# Patient Record
Sex: FEMALE | Race: WHITE
[De-identification: names, ages, dates, MRNs, and addresses within clinical notes are randomized per-mention and may not be internally consistent; named-entity substitution may affect disease eponyms.]

---

## 2017-03-03 ENCOUNTER — HOSPITAL ENCOUNTER (EMERGENCY)
Dept: HOSPITAL 10 - FTE | Age: 42
Discharge: HOME | End: 2017-03-03
Payer: COMMERCIAL

## 2017-03-03 VITALS
WEIGHT: 155.43 LBS | HEIGHT: 61 IN | BODY MASS INDEX: 29.34 KG/M2 | WEIGHT: 155.43 LBS | BODY MASS INDEX: 29.34 KG/M2 | HEIGHT: 61 IN

## 2017-03-03 VITALS — RESPIRATION RATE: 16 BRPM
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
## 2017-03-03 DIAGNOSIS — S00.03XA: Primary | ICD-10-CM

## 2017-03-03 PROCEDURE — 70486 CT MAXILLOFACIAL W/O DYE: CPT

## 2017-03-03 PROCEDURE — 70450 CT HEAD/BRAIN W/O DYE: CPT

## 2017-03-03 NOTE — RADRPT
PROCEDURE:   CT Brain without contrast. 

 

CLINICAL INDICATION:    Status post fell yesterday.  Headache. 

 

TECHNIQUE:   A CT of the brain was performed on a GE Light[employer]Heal[employer] 64-slice CT scanner utilizing axial 
imaging from the skull base through the vertex without IV contrast.  Multiplanar reformatted images 
were made.  Images were reviewed on a PACS workstation.  The CTDIvol is 38.2 mGy and the DLP is 554.
95 mGycm.  

One or the following dose reduction techniques were used:

-Automated exposure control.

-Adjustment of the mA and/or KV according to patient's size.

-Use of iterative reconstruction technique

 

 

COMPARISON:   None 

 

FINDINGS:

There is no intracranial hemorrhage, mass effect, or midline shift.  No extra-axial fluid collection
 is seen. The ventricles and sulci are normal in size and configuration. The density of the brain is
 normal, and the gray white matter differentiation appears well-preserved.  There is left frontal so
ft tissue swelling.  The visualized paranasal sinuses and osseous structures are grossly unremarkabl
e. 

 

IMPRESSION:

 

1.  No evidence of acute intracranial pathology.

 

2.  The brain is normal in appearance. 

 

3.  Left frontal soft tissue swelling.

 

 

RPTAT: AACC

_____________________________________________ 

Physician Leslie           Date    Time 

Electronically viewed and signed by Physician Leslie on 03/03/2017 13:05 

 

D:  03/03/2017 13:05  T:  03/03/2017 13:05

DIXIE/

## 2017-03-03 NOTE — RADRPT
PROCEDURE:   CT Maxillofacial without Contrast

 

CLINICAL INDICATION:  Headache, status post fall

 

TECHNIQUE:   Transaxial images were obtained through the maxillofacial region on a multi-slice scann
er without the intravenous contrast administration. Sagittal and coronal re-formations were subseque
ntly reconstructed.

 

One or more of the following dose reduction techniques were used:

- Automated exposure control.

- Adjustment of the mA and/or kV according to patient size.

- Use of iterative reconstruction technique.

 

Radiation dose: CTDIvol = 38.20 mGy; DLP = 554.95 mGy-cm.

 

COMPARISON:   No prior studies are available for comparison. 

 

FINDINGS:

 

Osseous structures: Appear intact with no fracture or destructive process evident.

 

Paranasal sinuses: Mucoperiosteal thickening is seen within several right posterior ethmoid air cell
s.  The remaining paranasal sinuses are well-aerated.

 

Mastoid air cells: Appear well pneumatized.

 

Temporomandibular joints: Appear unremarkable.

 

Orbits: The ocular globes, optic nerves, and intraorbital contents appear unremarkable.

 

Soft tissues: There is a subtle left frontal scalp hematoma.

 

IMPRESSION: 

1.  No fracture is identified.

2.  Mild inflammatory change involving posterior right ethmoid air cells.

3.  Subtle left frontal scalp hematoma.

_____________________________________________ 

Physician Cristian           Date    Time 

Electronically viewed and signed by Physician Cristian on 03/03/2017 13:12 

 

D:  03/03/2017 13:12  T:  03/03/2017 13:12

/

## 2017-03-03 NOTE — ERD
ER Documentation


Chief Complaint


Date/Time


DATE: 3/3/17 


TIME: 13:15


Chief Complaint


HEADACHE & HEAD PRESSURE S/P GROUND LEVEL MECHANICAL FALL





HPI


Patient is a 41-year-old female who presents to the emergency department with a 

headache and increased "head pressure" status post ground-level fall yesterday.

  Patient states that she was in the supermarket parking lot when she tripped 

on the concrete and fell.  Patient states she tried to catch her fall on her 

hands however her head took the brunt of the impact.   Patient denies any 

dizziness or lightheadedness prior to ground-level fall.  She stated that she 

remembers tripping and recalled the full event.  Patient reports an abrasion 

over her nasal bridge.  Patient also states that she had large scalp hematoma 

to the left frontal/temporal region.  Patient reports icing affected area.  

Patient states she currently has mild headache primarily in the frontal region.

  She describes her pain to be a 5 out of 10.  Patient states that the pain is 

constant.  Patient denies any blurry vision.  Patient admits to some light 

sensitivity.  Patient denies any nausea, vomiting, confusion, excessive 

sleepiness or loss of consciousness.  Patient states she did take 2 Advil last 

night for her headache.  Patient denies any chest pain, shortness of breath, 

abdominal pain, upper or lower extremity pain.  Patient is able to ambulate 

with any difficulty. LMP= 1 week ago.





ROS


All systems reviewed and are negative except as per history of present illness.





Allergies


Allergies:  


Coded Allergies:  


     No Known Allergy (Unverified , 3/3/17)





PMhx/Soc


Medical and Surgical Hx:  pt denies Medical Hx, pt denies Surgical Hx


Hx Alcohol Use:  Yes (occasional)


Hx Substance Use:  No


Hx Tobacco Use:  No


Smoking Status:  Never smoker





Physical Exam


Vitals





Vital Signs








  Date Time  Temp Pulse Resp B/P Pulse Ox O2 Delivery O2 Flow Rate FiO2


 


3/3/17 13:38   16   Room Air  


 


3/3/17 10:55 98.4 81 20 131/74 98   








Physical Exam


GENERAL: Well-developed, well-nourished female. Appears in no acute distress.  

Speaking in full sentences


HEAD: Normocephalic, atraumatic. +Scalp hematoma of left frontal head with 

superficial abrasions. Affected area in tender to palpation. No bilateral 

mastoid process tenderness or ecchymosis.


EYE: Pupils equal, round, and reactive to light. EOMs intact. No conjunctival 

erythema. No eye discharge.  No periorbital ecchymosis bilaterally


ENT: External ear without any masses or tenderness. Auditory canals clear 

bilaterally.  No hematotympanum bilaterally. TM visualized bilaterally, non-

erythematous, non-bulging. Nasal mucosa pink with no discharge. No septal 

hematoma. Oropharynx is pink without any tonsillar erythema or exudates. No 

uvula deviation. No kissing tonsils. 


NECK: Supple. No meningismus. Normal ROM of the neck.  No cervical midline 

spine tenderness.


LUNG: Clear to auscultation bilaterally. No rhonchi, wheezing, rales or coarse 

breath sounds. 


HEART: Regular rate and rhythm. No murmurs, rubs or gallops


ABDOMEN: Soft, nontender, and nondistended. Positive bowel sounds in all four 

quadrants. No rebound tenderness, no guarding. (-) McBurney's point tenderness.

  No CVA tenderness.


BACK: No midline tenderness. 


EXTREMITIES: Equal pulses bilaterally. No peripheral clubbing, cyanosis or 

edema. No unilateral leg swelling.  


NEUROLOGIC: Alert and oriented x3, cooperative. Mood and affect appropriate to 

situation. Cranial nerves II through XII are grossly intact. Normal speech. 

Motor exam: 5/5 strength in upper and lower extremities. Sensory exam: 

Sensation intact to light touch on all four extremities. Cerebellar function 

exam: No dysmetria on finger-to-nose. Steady gait. No pronator drift. 


SKIN: Normal color. Warm and dry. No rashes or lesions.


MSK: Normal active range of motion of bilateral upper extremities.  Normal 

range active of motion of bilateral lower extremities.





Procedures/MDM


ED COURSE:


The patient was stable throughout ED course. I kept the patient and/or family 

informed of laboratory and diagnostic imaging results throughout the ED course.

  








DIAGNOSTIC IMAGING:


Read by radiologist.








 DIAGNOSTIC IMAGING REPORT





 Patient: DION LOCKE   : 1975   Age: 41  Sex: F                 

       


 MR #:    [id number]   [facility] #:   [id number]    DOS: 17 1157


 Ordering MD: TIARA GONZALEZ PA-C   Location:  [facility]   Room/Bed:                 

                           


 








PROCEDURE:   CT Maxillofacial without Contrast


 


CLINICAL INDICATION:  Headache, status post fall


 


TECHNIQUE:   Transaxial images were obtained through the maxillofacial region 

on a multi-slice scanner without the intravenous contrast administration. 

Sagittal and coronal re-formations were subsequently reconstructed.


 


One or more of the following dose reduction techniques were used:


- Automated exposure control.


- Adjustment of the mA and/or kV according to patient size.


- Use of iterative reconstruction technique.


 


Radiation dose: CTDIvol = 38.20 mGy; DLP = 554.95 mGy-cm.


 


COMPARISON:   No prior studies are available for comparison. 


 


FINDINGS:


 


Osseous structures: Appear intact with no fracture or destructive process 

evident.


 


Paranasal sinuses: Mucoperiosteal thickening is seen within several right 

posterior ethmoid air cells.  The remaining paranasal sinuses are well-aerated.


 


Mastoid air cells: Appear well pneumatized.


 


Temporomandibular joints: Appear unremarkable.


 


Orbits: The ocular globes, optic nerves, and intraorbital contents appear 

unremarkable.


 


Soft tissues: There is a subtle left frontal scalp hematoma.


 


IMPRESSION: 


1.  No fracture is identified.


2.  Mild inflammatory change involving posterior right ethmoid air cells.


3.  Subtle left frontal scalp hematoma.


_____________________________________________ 


Physician Cristian           Date    Time 


Electronically viewed and signed by Physician Cristian on 2017 13:12 


 


D:  2017 13:12  T:  2017 13:12


RH/





CC: TIARA GONZALEZ PA-C








DIAGNOSTIC IMAGING REPORT





 Patient: DION LOCKE   : 1975   Age: 41  Sex: F                 

       


 MR #:    [id number]   Acct #:   [id number]    DOS: 17 1157


 Ordering MD: TIARA GONZALEZ PA-C   Location:  [facility]   Room/Bed:                 

                           


 








PROCEDURE:   CT Brain without contrast. 


 


CLINICAL INDICATION:    Status post fell yesterday.  Headache. 


 


TECHNIQUE:   A CT of the brain was performed on a [employer] 64-slice CT 

scanner utilizing axial imaging from the skull base through the vertex without 

IV contrast.  Multiplanar reformatted images were made.  Images were reviewed 

on a PACS workstation.  The CTDIvol is 38.2 mGy and the DLP is 554.95 mGycm.  


One or the following dose reduction techniques were used:


-Automated exposure control.


-Adjustment of the mA and/or KV according to patient's size.


-Use of iterative reconstruction technique


 


 


COMPARISON:   None 


 


FINDINGS:


There is no intracranial hemorrhage, mass effect, or midline shift.  No extra-

axial fluid collection is seen. The ventricles and sulci are normal in size and 

configuration. The density of the brain is normal, and the gray white matter 

differentiation appears well-preserved.  There is left frontal soft tissue 

swelling.  The visualized paranasal sinuses and osseous structures are grossly 

unremarkable. 


 


IMPRESSION:


 


1.  No evidence of acute intracranial pathology.


 


2.  The brain is normal in appearance. 


 


3.  Left frontal soft tissue swelling.


 


 


RPTAT: AACC


_____________________________________________ 


Physician Leslie           Date    Time 


Electronically viewed and signed by Physician Leslie on 2017 13:

05 


 


D:  2017 13:05  T:  2017 13:05


JH/





CC: TIARA GONZALEZ PA-C














MEDICAL DECISION MAKING:


This is a 41-year-old female who presents with headache and scalp hematoma 

status post ground-level fall yesterday.  She denied any lightheadedness or 

dizziness prior to fall.  Patient denied any nausea, vomiting, confusion, 

excessive sleepiness or loss of consciousness.  Vital signs were reviewed. 

Patient was afebrile. Patient is not hypoxic. Full neurological exam was normal 

CT scan of the brain showed no acute intracranial pathology.  CT scan of the 

facial bones was negative for acute fractures.  Given these findings, the 

patients presentation is most consistent with scalp hematoma s/p ground level 

fall. I have a much lower clinical concern for intracranial hemorrhage, 

meningitis, encephalitis, CO poisoning, temporal arteritis, benign intracranial 

hypertension, intracranial mass, glaucoma, preeclampsia, sinusitis, tension 

headache, migraine headache, cluster headache.  Low suspicion for facial 

fractures, septal hematoma, nasal fractures, globe rupture.











DISCHARGE:


At this time, patient is stable for discharge and outpatient management.  She 

will provided with a copy of all imaging studies.  I have encouraged the 

patient to hydrate well.  Strict head injury return precautions discussed with 

the patient. I have instructed the patient to follow-up with his/her primary 

care physician in 1-2 days. If symptoms persist, patient may need to see a 

specialist for further examinations and testing. I have instructed the patient 

to promptly return to the ER at any time for any new or worsening symptoms 

including increased increased pain, fever, nausea, vomiting, numbness, neck 

stiffness, visual changes, weakness or LOC. The patient and/or family expressed 

understanding of and agreement with this plan. All questions were answered. 

Home care instructions were provided.





Departure


Diagnosis:  


 Primary Impression:  


 Scalp hematoma


 Encounter type:  initial encounter  Qualified Code:  S00.03XA - Scalp hematoma

, initial encounter


 Additional Impression:  


 Fall from ground level


Condition:  Stable


Patient Instructions:  Hematoma


Referrals:  


[facility]


YOU HAVE RECEIVED A MEDICAL SCREENING EXAM AND THE RESULTS INDICATE THAT YOU DO 

NOT HAVE A CONDITION THAT REQUIRES URGENT TREATMENT IN THE EMERGENCY DEPARTMENT.





FURTHER EVALUATION AND TREATMENT OF YOUR CONDITION CAN WAIT UNTIL YOU ARE SEEN 

IN YOUR DOCTORS OFFICE WITHIN THE NEXT 1-2 DAYS. IT IS YOUR RESPONSIBILITY TO 

MAKE AN APPOINTMENT FOR FOLOW-UP CARE.





IF YOU HAVE A PRIMARY DOCTOR


--you should call your primary doctor and schedule an appointment





IF YOU DO NOT HAVE A PRIMARY DOCTOR YOU CAN CALL OUR PHYSICIAN REFERRAL HOTLINE 

AT


 [phone] 





IF YOU CAN NOT AFFORD TO SEE A PHYSICIAN YOU CAN CHOSE FROM THE FOLLOWING 

[facility] [phone]


[address] [facility]. [facility] [phone] [address] [facility]. [facility] [phone]


[address] [facility] [phone] [address] [facility]. [facility] [phone] [address] [facility]. [facility]. [phone]


[address] [facility]. [facility]


YOU HAVE RECEIVED A MEDICAL SCREENING EXAM AND THE RESULTS INDICATE THAT YOU DO 

NOT HAVE A CONDITION THAT REQUIRES URGENT TREATMENT IN THE EMERGENCY DEPARTMENT.





FURTHER EVALUATION AND TREATMENT OF YOUR CONDITION CAN WAIT UNTIL YOU ARE SEEN 

IN YOUR DOCTORS OFFICE WITHIN THE NEXT 1-2 DAYS. IT IS YOUR RESPONSIBILITY TO 

MAKE AN APPOINTMENT FOR FOLOW-UP CARE.





IF YOU HAVE A PRIMARY DOCTOR


--you should call your primary doctor and schedule and appointment





IF YOU DO NOT HAVE A PRIMARY DOCTOR YOU CAN CALL OUR PHYSICIAN REFERRAL HOTLINE 

AT [phone].





IF YOU CAN NOT AFFORD TO SEE A PHYSICIAN YOU CAN CHOSE FROM THE FOLLOWING 

[facility] INSTITUTIONS:





[facility]


[address] [place] [address]





[facility]


[address] [place] [address]





[facility]


[address] [place] [address]





Additional Instructions:  


Strict head injury precautions given to the patient.  Patient advised to return 

to the emergency department for any new or worsening symptoms including but not 

limited to severe pain, nausea, vomiting, excessive sleepiness, confusion or 

loss of consciousness.





Call your primary care doctor TOMORROW for an appointment during the next 1-2 

days.See the doctor sooner or return here if your condition worsens before your 

appointment time.











TIARA GNOZALEZ PA-C Mar 3, 2017 13:26

## 2017-08-21 ENCOUNTER — HOSPITAL ENCOUNTER (OUTPATIENT)
Dept: HOSPITAL 10 - LAB | Age: 42
Discharge: HOME | End: 2017-08-21
Attending: INTERNAL MEDICINE
Payer: COMMERCIAL

## 2017-08-21 DIAGNOSIS — R21: Primary | ICD-10-CM

## 2017-08-21 LAB
ADD UMIC: YES
ALBUMIN SERPL-MCNC: 4.3 G/DL (ref 3.3–4.9)
ALBUMIN/GLOB SERPL: 1.22 {RATIO}
ALP SERPL-CCNC: 89 IU/L (ref 42–121)
ALT SERPL-CCNC: 31 IU/L (ref 13–69)
ANION GAP SERPL CALC-SCNC: 13 MMOL/L (ref 8–16)
AST SERPL-CCNC: 19 IU/L (ref 15–46)
BACTERIA #/AREA URNS HPF: (no result) /HPF
BILIRUB DIRECT SERPL-MCNC: 0 MG/DL (ref 0–0.2)
BILIRUB SERPL-MCNC: 0.4 MG/DL (ref 0.2–1.3)
BUN SERPL-MCNC: 11 MG/DL (ref 7–20)
CALCIUM SERPL-MCNC: 9.1 MG/DL (ref 8.4–10.2)
CHLORIDE SERPL-SCNC: 106 MMOL/L (ref 97–110)
CHOLEST SERPL-MCNC: 151 MG/DL (ref 100–200)
CHOLEST/HDLC SERPL: 2.9 RATIO
CO2 SERPL-SCNC: 24 MMOL/L (ref 21–31)
COLOR UR: (no result)
CREAT SERPL-MCNC: 0.68 MG/DL (ref 0.44–1)
GLOBULIN SER-MCNC: 3.5 G/DL (ref 1.3–3.2)
GLUCOSE SERPL-MCNC: 99 MG/DL (ref 70–220)
GLUCOSE UR STRIP-MCNC: NEGATIVE MG/DL
HDLC SERPL-MCNC: 52 MG/DL (ref 34–88)
KETONES UR STRIP.AUTO-MCNC: NEGATIVE MG/DL
MUCOUS THREADS #/AREA URNS HPF: (no result) /HPF
NITRITE UR QL STRIP.AUTO: NEGATIVE MG/DL
POTASSIUM SERPL-SCNC: 4.1 MMOL/L (ref 3.5–5.1)
PROT SERPL-MCNC: 7.8 G/DL (ref 6.1–8.1)
RBC # UR AUTO: (no result) MG/DL
SODIUM SERPL-SCNC: 139 MMOL/L (ref 135–144)
SQUAMOUS #/AREA URNS HPF: (no result) /HPF
TRIGL SERPL-MCNC: 91 MG/DL (ref 0–149)
TSH SERPL-ACNC: 1.82 MIU/L (ref 0.47–4.68)
UR ASCORBIC ACID: NEGATIVE MG/DL
UR BILIRUBIN (DIP): NEGATIVE MG/DL
UR CLARITY: (no result)
UR PH (DIP): 5 (ref 5–9)
UR RBC: 11 /HPF (ref 0–5)
UR SPECIFIC GRAVITY (DIP): 1.03 (ref 1–1.03)
UR TOTAL PROTEIN (DIP): (no result) MG/DL
UROBILINOGEN UR STRIP-ACNC: NEGATIVE MG/DL
WBC # UR STRIP: (no result) LEU/UL

## 2017-08-21 PROCEDURE — 82306 VITAMIN D 25 HYDROXY: CPT

## 2017-08-21 PROCEDURE — 80053 COMPREHEN METABOLIC PANEL: CPT

## 2017-08-21 PROCEDURE — 84443 ASSAY THYROID STIM HORMONE: CPT

## 2017-08-21 PROCEDURE — 80061 LIPID PANEL: CPT

## 2017-08-21 PROCEDURE — 81001 URINALYSIS AUTO W/SCOPE: CPT

## 2017-09-07 ENCOUNTER — HOSPITAL ENCOUNTER (OUTPATIENT)
Dept: HOSPITAL 10 - LAB | Age: 42
Discharge: HOME | End: 2017-09-07
Attending: INTERNAL MEDICINE
Payer: COMMERCIAL

## 2017-09-07 DIAGNOSIS — N39.0: ICD-10-CM

## 2017-09-07 DIAGNOSIS — R21: Primary | ICD-10-CM

## 2017-09-07 LAB
ADD UMIC: YES
BASOPHILS # BLD AUTO: 0 10^3/UL (ref 0–0.1)
BASOPHILS NFR BLD: 0.4 % (ref 0–2)
COLOR UR: YELLOW
EOSINOPHIL # BLD: 0.3 10^3/UL (ref 0–0.5)
EOSINOPHIL NFR BLD: 4 % (ref 0–7)
ERYTHROCYTE [DISTWIDTH] IN BLOOD BY AUTOMATED COUNT: 13.2 % (ref 11.5–14.5)
GLUCOSE UR STRIP-MCNC: NEGATIVE MG/DL
HCT VFR BLD CALC: 37.7 % (ref 37–47)
HGB BLD-MCNC: 12.5 G/DL (ref 12–16)
KETONES UR STRIP.AUTO-MCNC: NEGATIVE MG/DL
LYMPHOCYTES # BLD AUTO: 3.4 10^3/UL (ref 0.8–2.9)
LYMPHOCYTES NFR BLD AUTO: 41.4 % (ref 15–51)
MCH RBC QN AUTO: 29.3 PG (ref 29–33)
MCHC RBC AUTO-ENTMCNC: 33.2 G/DL (ref 32–37)
MCV RBC AUTO: 88.3 FL (ref 82–101)
MONOCYTES # BLD: 0.5 10^3/UL (ref 0.3–0.9)
MONOCYTES NFR BLD: 5.7 % (ref 0–11)
MUCOUS THREADS #/AREA URNS HPF: (no result) /HPF
NEUTROPHILS NFR BLD AUTO: 48.3 % (ref 39–77)
NITRITE UR QL STRIP.AUTO: NEGATIVE MG/DL
NRBC # BLD MANUAL: 0 10^3/UL (ref 0–0)
NRBC BLD AUTO-RTO: 0 /100WBC (ref 0–0)
PLATELET # BLD: 254 10^3/UL (ref 140–415)
PMV BLD AUTO: 10 FL (ref 7.4–10.4)
RBC # BLD AUTO: 4.27 10^6/UL (ref 4.2–5.4)
RBC # UR AUTO: NEGATIVE MG/DL
SQUAMOUS #/AREA URNS HPF: (no result) /HPF
UR ASCORBIC ACID: NEGATIVE MG/DL
UR BILIRUBIN (DIP): NEGATIVE MG/DL
UR CLARITY: (no result)
UR PH (DIP): 5 (ref 5–9)
UR RBC: 1 /HPF (ref 0–5)
UR SPECIFIC GRAVITY (DIP): 1.02 (ref 1–1.03)
UR TOTAL PROTEIN (DIP): NEGATIVE MG/DL
UROBILINOGEN UR STRIP-ACNC: NEGATIVE MG/DL
WBC # BLD AUTO: 8.2 10^3/UL (ref 4.8–10.8)
WBC # UR STRIP: (no result) LEU/UL

## 2017-09-07 PROCEDURE — 81001 URINALYSIS AUTO W/SCOPE: CPT

## 2017-09-07 PROCEDURE — 87086 URINE CULTURE/COLONY COUNT: CPT

## 2017-09-07 PROCEDURE — 85025 COMPLETE CBC W/AUTO DIFF WBC: CPT

## 2017-12-27 ENCOUNTER — HOSPITAL ENCOUNTER (EMERGENCY)
Dept: HOSPITAL 10 - E/R | Age: 42
Discharge: HOME | End: 2017-12-27
Payer: COMMERCIAL

## 2017-12-27 VITALS
WEIGHT: 154.32 LBS | WEIGHT: 154.32 LBS | BODY MASS INDEX: 26.35 KG/M2 | HEIGHT: 64 IN | HEIGHT: 64 IN | BODY MASS INDEX: 26.35 KG/M2

## 2017-12-27 DIAGNOSIS — M76.11: Primary | ICD-10-CM

## 2017-12-27 LAB
ADD UMIC: YES
ALBUMIN SERPL-MCNC: 4.1 G/DL (ref 3.3–4.9)
ALBUMIN/GLOB SERPL: 1.2 {RATIO}
ALP SERPL-CCNC: 75 IU/L (ref 42–121)
ALT SERPL-CCNC: 30 IU/L (ref 13–69)
ANION GAP SERPL CALC-SCNC: 13 MMOL/L (ref 8–16)
AST SERPL-CCNC: 22 IU/L (ref 15–46)
BASOPHILS # BLD AUTO: 0 10^3/UL (ref 0–0.1)
BASOPHILS NFR BLD: 0.5 % (ref 0–2)
BILIRUB DIRECT SERPL-MCNC: 0 MG/DL (ref 0–0.2)
BILIRUB SERPL-MCNC: 0.3 MG/DL (ref 0.2–1.3)
BUN SERPL-MCNC: 11 MG/DL (ref 7–20)
CALCIUM SERPL-MCNC: 8.7 MG/DL (ref 8.4–10.2)
CHLORIDE SERPL-SCNC: 106 MMOL/L (ref 97–110)
CK SERPL-CCNC: 65 IU/L (ref 23–200)
CO2 SERPL-SCNC: 26 MMOL/L (ref 21–31)
COLOR UR: YELLOW
CREAT SERPL-MCNC: 0.59 MG/DL (ref 0.44–1)
EOSINOPHIL # BLD: 0.3 10^3/UL (ref 0–0.5)
EOSINOPHIL NFR BLD: 4.3 % (ref 0–7)
ERYTHROCYTE [DISTWIDTH] IN BLOOD BY AUTOMATED COUNT: 13.3 % (ref 11.5–14.5)
GLOBULIN SER-MCNC: 3.4 G/DL (ref 1.3–3.2)
GLUCOSE SERPL-MCNC: 96 MG/DL (ref 70–220)
GLUCOSE UR STRIP-MCNC: NEGATIVE MG/DL
HCT VFR BLD CALC: 37.4 % (ref 37–47)
HGB BLD-MCNC: 12.7 G/DL (ref 12–16)
KETONES UR STRIP.AUTO-MCNC: NEGATIVE MG/DL
LYMPHOCYTES # BLD AUTO: 2.9 10^3/UL (ref 0.8–2.9)
LYMPHOCYTES NFR BLD AUTO: 36.5 % (ref 15–51)
MCH RBC QN AUTO: 29.8 PG (ref 29–33)
MCHC RBC AUTO-ENTMCNC: 34 G/DL (ref 32–37)
MCV RBC AUTO: 87.8 FL (ref 82–101)
MONOCYTES # BLD: 0.4 10^3/UL (ref 0.3–0.9)
MONOCYTES NFR BLD: 5.4 % (ref 0–11)
MUCOUS THREADS #/AREA URNS HPF: (no result) /HPF
NEUTROPHILS # BLD: 4.2 10^3/UL (ref 1.6–7.5)
NEUTROPHILS NFR BLD AUTO: 53.2 % (ref 39–77)
NITRITE UR QL STRIP.AUTO: NEGATIVE MG/DL
NRBC # BLD MANUAL: 0 10^3/UL (ref 0–0)
NRBC BLD AUTO-RTO: 0 /100WBC (ref 0–0)
PLATELET # BLD: 283 10^3/UL (ref 140–415)
PMV BLD AUTO: 10.8 FL (ref 7.4–10.4)
POTASSIUM SERPL-SCNC: 4.4 MMOL/L (ref 3.5–5.1)
PROT SERPL-MCNC: 7.5 G/DL (ref 6.1–8.1)
RBC # BLD AUTO: 4.26 10^6/UL (ref 4.2–5.4)
RBC # UR AUTO: (no result) MG/DL
SODIUM SERPL-SCNC: 141 MMOL/L (ref 135–144)
UR ASCORBIC ACID: NEGATIVE MG/DL
UR BILIRUBIN (DIP): NEGATIVE MG/DL
UR CLARITY: CLEAR
UR PH (DIP): 5 (ref 5–9)
UR RBC: > 182 /HPF (ref 0–5)
UR SPECIFIC GRAVITY (DIP): 1.02 (ref 1–1.03)
UR TOTAL PROTEIN (DIP): NEGATIVE MG/DL
UROBILINOGEN UR STRIP-ACNC: NEGATIVE MG/DL
WBC # BLD AUTO: 7.9 10^3/UL (ref 4.8–10.8)
WBC # UR STRIP: NEGATIVE LEU/UL

## 2017-12-27 PROCEDURE — 81001 URINALYSIS AUTO W/SCOPE: CPT

## 2017-12-27 PROCEDURE — 73700 CT LOWER EXTREMITY W/O DYE: CPT

## 2017-12-27 PROCEDURE — 82550 ASSAY OF CK (CPK): CPT

## 2017-12-27 PROCEDURE — 80053 COMPREHEN METABOLIC PANEL: CPT

## 2017-12-27 PROCEDURE — 72131 CT LUMBAR SPINE W/O DYE: CPT

## 2017-12-27 PROCEDURE — 36415 COLL VENOUS BLD VENIPUNCTURE: CPT

## 2017-12-27 PROCEDURE — 85025 COMPLETE CBC W/AUTO DIFF WBC: CPT

## 2017-12-27 NOTE — RADRPT
PROCEDURE:   CT Lumbar Spine. 

 

CLINICAL INDICATION:   Severe pain 

 

TECHNIQUE:  Axial imaging was obtained through the lumbar spine using a multi-slice CT scanner.  Sta
ndard CT scan of the lumbar spine without contrast protocols were performed.  

 

CTDI: 18.05 and DLP: 659.23.

 

One or more of the following dose reduction techniques were used:

- Automated exposure control.

- Adjustment of the mA and/or kV according to patient size.

Use of iterative reconstruction technique.

Dicom images are available

 

COMPARISON:   None. 

 

FINDINGS:

 

There are no acute fractures or subluxations. Multilevel Schmorl's node formation. Bony mineralizati
on is normal without focal bony blastic or lytic lesions. The posterior elements are intact. Mild de
generative enthesopathy involving the lower thoracic and lumbar spine.

 

At the L3-4, L4-5 and L5-S1 disc levels there are mild broad-based disc bulges. No evidence of disc 
herniations. No evidence of central spinal canal or neural foraminal stenosis. No paraspinous masses
.

 

Those portions of the abdomen and retroperitoneum visualized are unremarkable.

 

IMPRESSION:

 

1.  No evidence of fractures or subluxations.

2.  Degenerate disc bulges at the L3-4 milliliter 04/05 and L5-S1 disc levels. No evidence of centra
l spinal canal or neural foraminal stenosis.

3.  Mild degenerative changes as described above.

 

 

RPTAT:AAJJ

_____________________________________________ 

Physician Alex           Date    Time 

Electronically viewed and signed by Physician Alex on 12/27/2017 07:37 

 

D:  12/27/2017 07:37  T:  12/27/2017 07:37

BM/
PROCEDURE:   CT scan of the right hip without contrast. 

 

CLINICAL INDICATION:   Pain

 

TECHNIQUE:   CT scan of the  right hip without contrast [facility].   Standard CT scan of the right hip without contrast protocols were performed.  

 

The total exam CTDI equals 18.34 mGy and the total exam DLP equals 553.81 mGy-cm. 

 

One or more of the following dose reduction techniques were used:

- Automated exposure control.

- Adjustment of the mA and/or kV according to patient size.

Use of iterative reconstruction technique.

Dicom images are available

 

COMPARISON:   None. 

 

FINDINGS:   

 

There is no acute fractures or dislocations involving the right hip. There is subchondral cyst forma
tion involving the anterior right supra acetabulum consistent with degenerative changes. There are n
o focal bony blastic or lytic lesions. No evidence of erosions. No evidence of periostitis. Mild raphael
rowing of the right hip joint space consistent with degenerative joint disease. No evidence of right
 hip joint effusion. Soft tissues are unremarkable.

 

IMPRESSION:

 

1.  No evidence acute fractures or dislocations.

2.  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

3.  No evidence of a right hip joint effusion. Soft tissues unremarkable.

 

 

RPTAT:AAJJ

_____________________________________________ 

Physician Alex           Date    Time 

Electronically viewed and signed by Physician Alex on 12/27/2017 07:41 

 

D:  12/27/2017 07:41  T:  12/27/2017 07:41

MARIE/
Yes

## 2017-12-27 NOTE — ERD
ER Documentation


Chief Complaint


Chief Complaint


C/O RT HIP/LEG PAIN SINCE LAST NIGHT. NO TRAUMA.





HPI


42-year-old woman complains of right groin pain last night when going from a 

sitting to standing position.  She felt a tightness to the right groin and pain 

which radiated down to the right knee, she felt stiff and could not ambulate 

for a few minutes during this episode of pain.  She also describes mild to 

moderate lower back pain worse on the right side compared to the left.  She 

denies previous similar symptoms, no weight loss, no fevers or chills, no 

abdominal pain, no dysuria, no vaginal discharge, no headache or blurry vision.

  She denies pain at this time.  Patient has had no trauma.





ROS


All systems reviewed and are negative except as per history of present illness.





Medications


Home Meds


Active Scripts


Cyclobenzaprine Hcl* (Cyclobenzaprine Hcl*) 10 Mg Tablet, 10 MG PO TID for 

MUSCLE SPASMS, #15 TAB


   Prov:BRIGETTE LOREDO MD         12/27/17


Ibuprofen* (Ibuprofen*) 600 Mg Tablet, 600 MG PO Q8 for PAIN AND/OR INFLAMMATION

, #30 TAB


   Prov:BRIGETTE LOREDO MD         12/27/17





Allergies


Allergies:  


Coded Allergies:  


     No Known Allergy (Unverified , 3/3/17)





PMhx/Soc


None


Medical and Surgical Hx:  pt denies Medical Hx, pt denies Surgical Hx


Hx Alcohol Use:  Yes (occasional)


Hx Substance Use:  No


Hx Tobacco Use:  No


Smoking Status:  Never smoker





FmHx


Family History:  No diabetes





Physical Exam


Vitals





Vital Signs








  Date Time  Temp Pulse Resp B/P Pulse Ox O2 Delivery O2 Flow Rate FiO2


 


12/27/17 06:20 98.1 72 19 144/83 99   








Physical Exam


GENERAL: Well-developed, well-nourished, well-hydrated, in no apparent distress

, looks nontoxic in appearance


HEENT: Moist mucous membranes, pink conjunctiva, no cervical spine tenderness 

or step-off deformities, no goiter, no jaundice or icterus, extraocular 

movements intact without pain. No submandibular induration, and no pharyngeal 

erythema


NEURO: Alert and oriented 3, cranial nerves II through XII intact bilaterally, 

pupils equal round reactive to light, no focal deficits or facial asymmetry, 

sensation intact distally Strength 5/5 in upper and lower extremities 

bilaterally


CARDIAC: Regular rate and rhythm, no murmurs rubs or gallops


LUNGS: Clear bilaterally no wheezing crackles or stridor


ABDOMEN: Soft nontender, no guarding, no rigidity, no rebound, no psoas sign no 

obturator sign. Normoactive bowel sounds


SKIN: Warm and dry to touch, no abrasions, contusions, or hematomas, no 

lacerations, no ecchymosis, no target lesions, and without ulcers


EXTREMITIES: No clubbing cyanosis or edema, calves are bilaterally symmetrical, 

no Homans sign, no popliteal cord sign. Distal pulses equal and bilateral


PSYCH: Normal affect without agitation or irritability


Result Diagram:  


12/27/17 0645                                                                  

              12/27/17 0720





Results 24 hrs





 Laboratory Tests








Test


  12/27/17


06:45 12/27/17


07:20 12/27/17


07:27


 


White Blood Count 7.910^3/ul   


 


Red Blood Count 4.2610^6/ul   


 


Hemoglobin 12.7g/dl   


 


Hematocrit 37.4%   


 


Mean Corpuscular Volume 87.8fl   


 


Mean Corpuscular Hemoglobin 29.8pg   


 


Mean Corpuscular Hemoglobin


Concent 34.0g/dl 


  


  


 


 


Red Cell Distribution Width 13.3%   


 


Platelet Count 94805^3/UL   


 


Mean Platelet Volume 10.8fl   


 


Neutrophils % 53.2%   


 


Lymphocytes % 36.5%   


 


Monocytes % 5.4%   


 


Eosinophils % 4.3%   


 


Basophils % 0.5%   


 


Nucleated Red Blood Cells % 0.0/100WBC   


 


Neutrophils # 4.210^3/ul   


 


Lymphocytes # 2.910^3/ul   


 


Monocytes # 0.410^3/ul   


 


Eosinophils # 0.310^3/ul   


 


Basophils # 0.010^3/ul   


 


Nucleated Red Blood Cells # 0.010^3/ul   


 


Sodium Level  141mmol/L  


 


Potassium Level  4.4mmol/L  


 


Chloride Level  106mmol/L  


 


Carbon Dioxide Level  26mmol/L  


 


Anion Gap  13  


 


Blood Urea Nitrogen  11mg/dl  


 


Creatinine  0.59mg/dl  


 


Glucose Level  96mg/dl  


 


Calcium Level  8.7mg/dl  


 


Total Bilirubin  0.3mg/dl  


 


Direct Bilirubin  0.00mg/dl  


 


Indirect Bilirubin  0.3mg/dl  


 


Aspartate Amino Transf


(AST/SGOT) 


  22IU/L 


  


 


 


Alanine Aminotransferase


(ALT/SGPT) 


  30IU/L 


  


 


 


Alkaline Phosphatase  75IU/L  


 


Creatine Kinase  65IU/L  


 


Total Protein  7.5g/dl  


 


Albumin  4.1g/dl  


 


Globulin  3.40g/dl  


 


Albumin/Globulin Ratio  1.20  


 


Urine Color   YELLOW 


 


Urine Clarity   CLEAR 


 


Urine pH   5.0 


 


Urine Specific Gravity   1.021 


 


Urine Ketones   NEGATIVEmg/dL 


 


Urine Nitrite   NEGATIVEmg/dL 


 


Urine Bilirubin   NEGATIVEmg/dL 


 


Urine Urobilinogen   NEGATIVEmg/dL 


 


Urine Leukocyte Esterase   NEGATIVELeu/ul 


 


Urine Microscopic RBC   > 182/HPF 


 


Urine Microscopic WBC   2/HPF 


 


Urine Mucus   MODERATE/HPF 


 


Urine Hemoglobin   3+mg/dL 


 


Urine Glucose   NEGATIVEmg/dL 


 


Urine Total Protein   NEGATIVEmg/dl 








 Current Medications








 Medications


  (Trade)  Dose


 Ordered  Sig/Bigg


 Route


 PRN Reason  Start Time


 Stop Time Status Last Admin


Dose Admin


 


 Ibuprofen


  (Motrin)  600 mg  ONCE  ONCE


 PO


   12/27/17 06:30


 12/27/17 06:31 DC 12/27/17 07:39


 











Procedures/MDM


I administered ibuprofen 600 mg p.o. 1





CT scan of the right lower extremity was performed no acute fracture 

dislocation noted.  Please refer to radiologist dictation for full report.





CT scan of the lumbar spine performed no acute fracture dislocation noted.  

Please refer to radiologist dictation for full report.





CBC and electrolytes are normal, liver function tests were normal.





Patient is asymptomatic at this time.  I suspect musculoskeletal spasm versus 

acute tendinitis, both of which can be managed with NSAIDs.  I suspect psoas 

syndrome, patient will be discharged to follow-up with PMD.





Differential diagnoses considered, included but not limited to acute coronary 

syndrome, pulmonary embolism, aortic dissection, abdominal aortic aneurysm, 

sepsis, stroke, meningitis, encephalitis, pneumonia, appendicitis, cholecystitis

, bowel obstruction, pyelonephritis, nephrolithiasis, cystitis, as well as 

metabolic, hematologic, and electrolyte abnormalities. As well as abscess, 

cellulitis, fractures, and dislocations.


Patient feels much better at this time, and vital signs are normal, symptoms 

have improved. I did give strict instructions to return to the ED if symptoms 

continue or worsen, patient will otherwise follow-up with primary care 

physician. Patient understood instructions and agreed to plan.





Disclaimer: Inadvertent spelling and grammatical errors are likely due to EHR/

dictation software use and do not reflect on the overall quality of patient 

care. Also, please note that the electronic time recorded on this note does not 

necessarily reflect the actual time of the patient encounter.





Departure


Diagnosis:  


 Primary Impression:  


 Psoas tendinitis


 Laterality:  right  Qualified Code:  M76.11 - Psoas tendinitis of right side


 Additional Impression:  


 Psoas syndrome


Condition:  Good


Patient Instructions:  Muscle Spasm











BRIGETTE LOREDO MD Dec 27, 2017 08:50

## 2018-10-05 ENCOUNTER — HOSPITAL ENCOUNTER (OUTPATIENT)
Dept: HOSPITAL 91 - U/S | Age: 43
Discharge: HOME | End: 2018-10-05
Payer: COMMERCIAL

## 2018-10-05 ENCOUNTER — HOSPITAL ENCOUNTER (OUTPATIENT)
Age: 43
Discharge: HOME | End: 2018-10-05

## 2018-10-05 DIAGNOSIS — E04.9: Primary | ICD-10-CM

## 2018-10-05 PROCEDURE — 76536 US EXAM OF HEAD AND NECK: CPT
